# Patient Record
Sex: FEMALE | Race: BLACK OR AFRICAN AMERICAN | NOT HISPANIC OR LATINO | Employment: FULL TIME | URBAN - METROPOLITAN AREA
[De-identification: names, ages, dates, MRNs, and addresses within clinical notes are randomized per-mention and may not be internally consistent; named-entity substitution may affect disease eponyms.]

---

## 2018-11-26 ENCOUNTER — APPOINTMENT (OUTPATIENT)
Dept: CT IMAGING | Facility: HOSPITAL | Age: 48
End: 2018-11-26

## 2018-11-26 ENCOUNTER — HOSPITAL ENCOUNTER (EMERGENCY)
Facility: HOSPITAL | Age: 48
Discharge: HOME | End: 2018-11-26
Attending: EMERGENCY MEDICINE | Admitting: EMERGENCY MEDICINE

## 2018-11-26 VITALS
BODY MASS INDEX: 39.5 KG/M2 | TEMPERATURE: 97.1 F | RESPIRATION RATE: 16 BRPM | WEIGHT: 266.7 LBS | HEIGHT: 69 IN | OXYGEN SATURATION: 97 % | DIASTOLIC BLOOD PRESSURE: 100 MMHG | HEART RATE: 67 BPM | SYSTOLIC BLOOD PRESSURE: 148 MMHG

## 2018-11-26 DIAGNOSIS — R42 VERTIGO: Primary | ICD-10-CM

## 2018-11-26 DIAGNOSIS — R51.9 SINUS HEADACHE: ICD-10-CM

## 2018-11-26 LAB
ALBUMIN SERPL-MCNC: 3.9 G/DL (ref 3.5–5.2)
ALBUMIN/GLOB SERPL: 1.1 G/DL
ALP SERPL-CCNC: 110 U/L (ref 39–117)
ALT SERPL W P-5'-P-CCNC: 12 U/L (ref 1–33)
ANION GAP SERPL CALCULATED.3IONS-SCNC: 10.7 MMOL/L
AST SERPL-CCNC: 15 U/L (ref 1–32)
BASOPHILS # BLD AUTO: 0.01 10*3/MM3 (ref 0–0.2)
BASOPHILS NFR BLD AUTO: 0.1 % (ref 0–1.5)
BILIRUB SERPL-MCNC: 0.3 MG/DL (ref 0.1–1.2)
BUN BLD-MCNC: 8 MG/DL (ref 6–20)
BUN/CREAT SERPL: 10 (ref 7–25)
CALCIUM SPEC-SCNC: 9.7 MG/DL (ref 8.6–10.5)
CHLORIDE SERPL-SCNC: 107 MMOL/L (ref 98–107)
CO2 SERPL-SCNC: 27.3 MMOL/L (ref 22–29)
CREAT BLD-MCNC: 0.8 MG/DL (ref 0.57–1)
DEPRECATED RDW RBC AUTO: 44.9 FL (ref 37–54)
EOSINOPHIL # BLD AUTO: 0.08 10*3/MM3 (ref 0–0.7)
EOSINOPHIL NFR BLD AUTO: 1 % (ref 0.3–6.2)
ERYTHROCYTE [DISTWIDTH] IN BLOOD BY AUTOMATED COUNT: 13 % (ref 11.7–13)
GFR SERPL CREATININE-BSD FRML MDRD: 92 ML/MIN/1.73
GLOBULIN UR ELPH-MCNC: 3.7 GM/DL
GLUCOSE BLD-MCNC: 82 MG/DL (ref 65–99)
HCT VFR BLD AUTO: 40.1 % (ref 35.6–45.5)
HGB BLD-MCNC: 13.7 G/DL (ref 11.9–15.5)
HOLD SPECIMEN: NORMAL
HOLD SPECIMEN: NORMAL
IMM GRANULOCYTES # BLD: 0.01 10*3/MM3 (ref 0–0.03)
IMM GRANULOCYTES NFR BLD: 0.1 % (ref 0–0.5)
LYMPHOCYTES # BLD AUTO: 2.81 10*3/MM3 (ref 0.9–4.8)
LYMPHOCYTES NFR BLD AUTO: 34.3 % (ref 19.6–45.3)
MCH RBC QN AUTO: 32.8 PG (ref 26.9–32)
MCHC RBC AUTO-ENTMCNC: 34.2 G/DL (ref 32.4–36.3)
MCV RBC AUTO: 95.9 FL (ref 80.5–98.2)
MONOCYTES # BLD AUTO: 0.58 10*3/MM3 (ref 0.2–1.2)
MONOCYTES NFR BLD AUTO: 7.1 % (ref 5–12)
NEUTROPHILS # BLD AUTO: 4.72 10*3/MM3 (ref 1.9–8.1)
NEUTROPHILS NFR BLD AUTO: 57.5 % (ref 42.7–76)
PLATELET # BLD AUTO: 223 10*3/MM3 (ref 140–500)
PMV BLD AUTO: 10.2 FL (ref 6–12)
POTASSIUM BLD-SCNC: 3.9 MMOL/L (ref 3.5–5.2)
PROT SERPL-MCNC: 7.6 G/DL (ref 6–8.5)
RBC # BLD AUTO: 4.18 10*6/MM3 (ref 3.9–5.2)
SODIUM BLD-SCNC: 145 MMOL/L (ref 136–145)
WBC NRBC COR # BLD: 8.2 10*3/MM3 (ref 4.5–10.7)
WHOLE BLOOD HOLD SPECIMEN: NORMAL
WHOLE BLOOD HOLD SPECIMEN: NORMAL

## 2018-11-26 PROCEDURE — 96374 THER/PROPH/DIAG INJ IV PUSH: CPT

## 2018-11-26 PROCEDURE — 96375 TX/PRO/DX INJ NEW DRUG ADDON: CPT

## 2018-11-26 PROCEDURE — 99284 EMERGENCY DEPT VISIT MOD MDM: CPT

## 2018-11-26 PROCEDURE — 70450 CT HEAD/BRAIN W/O DYE: CPT

## 2018-11-26 PROCEDURE — 80053 COMPREHEN METABOLIC PANEL: CPT | Performed by: PHYSICIAN ASSISTANT

## 2018-11-26 PROCEDURE — 85025 COMPLETE CBC W/AUTO DIFF WBC: CPT | Performed by: PHYSICIAN ASSISTANT

## 2018-11-26 PROCEDURE — 25010000002 KETOROLAC TROMETHAMINE PER 15 MG: Performed by: EMERGENCY MEDICINE

## 2018-11-26 PROCEDURE — 25010000002 ONDANSETRON PER 1 MG: Performed by: EMERGENCY MEDICINE

## 2018-11-26 RX ORDER — MECLIZINE HYDROCHLORIDE 25 MG/1
25 TABLET ORAL ONCE
Status: COMPLETED | OUTPATIENT
Start: 2018-11-26 | End: 2018-11-26

## 2018-11-26 RX ORDER — ONDANSETRON 2 MG/ML
4 INJECTION INTRAMUSCULAR; INTRAVENOUS ONCE
Status: COMPLETED | OUTPATIENT
Start: 2018-11-26 | End: 2018-11-26

## 2018-11-26 RX ORDER — ONDANSETRON 4 MG/1
4 TABLET, FILM COATED ORAL EVERY 6 HOURS PRN
Qty: 10 TABLET | Refills: 0 | Status: SHIPPED | OUTPATIENT
Start: 2018-11-26

## 2018-11-26 RX ORDER — KETOROLAC TROMETHAMINE 15 MG/ML
15 INJECTION, SOLUTION INTRAMUSCULAR; INTRAVENOUS ONCE
Status: COMPLETED | OUTPATIENT
Start: 2018-11-26 | End: 2018-11-26

## 2018-11-26 RX ORDER — MECLIZINE HYDROCHLORIDE 25 MG/1
25 TABLET ORAL EVERY 6 HOURS PRN
Qty: 10 TABLET | Refills: 0 | Status: SHIPPED | OUTPATIENT
Start: 2018-11-26

## 2018-11-26 RX ADMIN — ONDANSETRON 4 MG: 2 INJECTION INTRAMUSCULAR; INTRAVENOUS at 19:11

## 2018-11-26 RX ADMIN — KETOROLAC TROMETHAMINE 15 MG: 15 INJECTION, SOLUTION INTRAMUSCULAR; INTRAVENOUS at 19:12

## 2018-11-26 RX ADMIN — MECLIZINE HYDROCHLORIDE 25 MG: 25 TABLET ORAL at 19:13

## 2018-11-26 NOTE — ED PROVIDER NOTES
EMERGENCY DEPARTMENT ENCOUNTER    CHIEF COMPLAINT  Chief Complaint: HA  History given by: patient, family  History limited by: nothing  Room Number: 29/29  PMD: Provider, No Known      HPI:  Pt is a 48 y.o. female who presents complaining of a frontal HA that began two days ago when she woke up. Pt states that she also had dizziness (described as the room spinning). Pt states that her HA and dizziness improved after several hours. Pt states that her HA and dizziness woke her up from sleep around 0200 this morning. Pt states that she then developed nausea and dry heaving. Pt states that she took Aleve with resolution of her HA until around 1100 this morning. Pt states that her HA is worse after leaning backwards. Pt denies nasal drainage, sinus congestion or fever but complains of chills. Pt states that she went to  at that time and was then sent to the ED for further evaluation her symptoms.     Duration:  3 days  Onset: gradual  Timing: intermittent  Location: frontal head  Radiation: none  Quality: unspecified  Intensity/Severity: moderate to severe  Progression: worsening  Associated Symptoms: nausea, dry heaving, dizziness, chills  Aggravating Factors: leaning backwards  Alleviating Factors: none  Previous Episodes: none mentioned  Treatment before arrival: Pt states that she went to  at that time and was then sent to the ED for further evaluation her symptoms.       PAST MEDICAL HISTORY  Active Ambulatory Problems     Diagnosis Date Noted   • No Active Ambulatory Problems     Resolved Ambulatory Problems     Diagnosis Date Noted   • No Resolved Ambulatory Problems     No Additional Past Medical History       PAST SURGICAL HISTORY  Past Surgical History:   Procedure Laterality Date   • HYSTERECTOMY         FAMILY HISTORY  Family History   Problem Relation Age of Onset   • No Known Problems Mother    • No Known Problems Father        SOCIAL HISTORY  Social History     Socioeconomic History   • Marital  status:      Spouse name: Not on file   • Number of children: Not on file   • Years of education: Not on file   • Highest education level: Not on file   Social Needs   • Financial resource strain: Not on file   • Food insecurity - worry: Not on file   • Food insecurity - inability: Not on file   • Transportation needs - medical: Not on file   • Transportation needs - non-medical: Not on file   Occupational History   • Not on file   Tobacco Use   • Smoking status: Former Smoker   Substance and Sexual Activity   • Alcohol use: No   • Drug use: Yes     Types: Marijuana   • Sexual activity: Defer   Other Topics Concern   • Not on file   Social History Narrative   • Not on file       ALLERGIES  Patient has no known allergies.    REVIEW OF SYSTEMS  Review of Systems   Constitutional: Positive for chills. Negative for fever.   HENT: Negative for sore throat.    Eyes: Negative.    Respiratory: Negative for cough and shortness of breath.    Cardiovascular: Negative for chest pain.   Gastrointestinal: Positive for nausea and vomiting (dry heaving). Negative for abdominal pain and diarrhea.   Genitourinary: Negative for dysuria.   Musculoskeletal: Negative for neck pain.   Skin: Negative for rash.   Allergic/Immunologic: Negative.    Neurological: Positive for dizziness and headaches. Negative for weakness and numbness.   Hematological: Negative.    Psychiatric/Behavioral: Negative.    All other systems reviewed and are negative.      PHYSICAL EXAM  ED Triage Vitals   Temp Heart Rate Resp BP SpO2   11/26/18 1649 11/26/18 1649 11/26/18 1649 11/26/18 1720 11/26/18 1649   97.1 °F (36.2 °C) 81 15 (!) 136/104 97 %      Temp src Heart Rate Source Patient Position BP Location FiO2 (%)   11/26/18 1649 11/26/18 1649 -- 11/26/18 1720 --   Tympanic Monitor  Right arm        Physical Exam   Constitutional: She is oriented to person, place, and time. No distress.   HENT:   Head: Normocephalic and atraumatic.   Right Ear: Tympanic  membrane normal.   Left Ear: Tympanic membrane normal.   Nose: Sinus tenderness (mild, frontal) present.   Mouth/Throat: Mucous membranes are normal.   Eyes: Pupils are equal, round, and reactive to light. Right eye exhibits nystagmus. Left eye exhibits nystagmus.   Nystagmus to the right that mildly reproduces her dizziness   Neck: Normal range of motion. Neck supple.   Cardiovascular: Normal rate, regular rhythm and normal heart sounds.   Pulmonary/Chest: Effort normal and breath sounds normal. No respiratory distress.   Abdominal: Soft. Bowel sounds are normal. She exhibits no distension. There is no tenderness. There is no rebound and no guarding.   Musculoskeletal: Normal range of motion. She exhibits no edema.   Neurological: She is alert and oriented to person, place, and time. She has normal sensation and normal strength.   Skin: Skin is warm and dry. No rash noted.   Psychiatric: Mood and affect normal.   Nursing note and vitals reviewed.      LAB RESULTS  Lab Results (last 24 hours)     Procedure Component Value Units Date/Time    CBC & Differential [[id number]] Collected:  11/26/18 1723    Specimen:  Blood Updated:  11/26/18 1739    Narrative:       The following orders were created for panel order CBC & Differential.  Procedure                               Abnormality         Status                     ---------                               -----------         ------                     CBC Auto Differential[[id number]]         Abnormal            Final result                 Please view results for these tests on the individual orders.    Comprehensive Metabolic Panel [[id number]] Collected:  11/26/18 1723    Specimen:  Blood Updated:  11/26/18 1755     Glucose 82 mg/dL      BUN 8 mg/dL      Creatinine 0.80 mg/dL      Sodium 145 mmol/L      Potassium 3.9 mmol/L      Chloride 107 mmol/L      CO2 27.3 mmol/L      Calcium 9.7 mg/dL      Total Protein 7.6 g/dL      Albumin 3.90 g/dL      ALT (SGPT) 12 U/L       AST (SGOT) 15 U/L      Alkaline Phosphatase 110 U/L      Total Bilirubin 0.3 mg/dL      eGFR  African Amer 92 mL/min/1.73      Globulin 3.7 gm/dL      A/G Ratio 1.1 g/dL      BUN/Creatinine Ratio 10.0     Anion Gap 10.7 mmol/L     CBC Auto Differential [[id number]]  (Abnormal) Collected:  11/26/18 1723    Specimen:  Blood Updated:  11/26/18 1739     WBC 8.20 10*3/mm3      RBC 4.18 10*6/mm3      Hemoglobin 13.7 g/dL      Hematocrit 40.1 %      MCV 95.9 fL      MCH 32.8 pg      MCHC 34.2 g/dL      RDW 13.0 %      RDW-SD 44.9 fl      MPV 10.2 fL      Platelets 223 10*3/mm3      Neutrophil % 57.5 %      Lymphocyte % 34.3 %      Monocyte % 7.1 %      Eosinophil % 1.0 %      Basophil % 0.1 %      Immature Grans % 0.1 %      Neutrophils, Absolute 4.72 10*3/mm3      Lymphocytes, Absolute 2.81 10*3/mm3      Monocytes, Absolute 0.58 10*3/mm3      Eosinophils, Absolute 0.08 10*3/mm3      Basophils, Absolute 0.01 10*3/mm3      Immature Grans, Absolute 0.01 10*3/mm3           I ordered the above labs and reviewed the results    RADIOLOGY  CT Head Without Contrast   Preliminary Result   There is minimal small vessel disease in the frontal   periventricular white matter. Otherwise, the head CT is within normal   limits. The etiology of the headache, nausea and dizziness is not   established on this exam. The results were communicated to Zane Starks,   the physician assistant in the ER taking care of the patient, by   telephone on 11/26/2018 at 5:45 PM.       Radiation dose reduction techniques were utilized, including automated   exposure control and exposure modulation based on body size.                   I ordered the above noted radiological studies. Interpreted by radiologist. Reviewed by me in PACS.       PROCEDURES  Procedures      PROGRESS AND CONSULTS  ED Course as of Nov 26 1947 Mon Nov 26, 2018   1708 3 day h/o HA, dizziness  [EE]   1749 Neg CT per [facility]  [EE]      ED Course User Index  [EE] Zane Starks, PA     1841-  Notified pt and family of the pt's lab and imaging results, including the pt's negative CT Head and WBC count. D/w pt and family that I believe that the pt's symptoms are c/w vertigo secondary to a sinus HA and pressure. Discussed the plan to order medication in the ED prior to discharging the pt home with prescriptions for meclizine and zofran. Pt understands and agrees with the plan, all questions answered.    1859- Ordered meclizine for dizziness, zofran for nausea and toradol for HA.    1943- Rechecked pt. Pt is resting comfortably and states that she feels much better after medication in the ED. Discussed the plan to discharge the pt home with prescriptions for meclizine and zofran. I instructed the pt to take Advil Cold and Flu for her Sinus HA. Pt understands and agrees with the plan, all questions answered.      MEDICAL DECISION MAKING  Results were reviewed/discussed with the patient and they were also made aware of online access. Pt also made aware that some labs, such as cultures, will not be resulted during ER visit and follow up with PMD is necessary.     MDM  Number of Diagnoses or Management Options  Sinus headache:   Vertigo:      Amount and/or Complexity of Data Reviewed  Clinical lab tests: reviewed and ordered (WBC=8.20)  Tests in the radiology section of CPT®: ordered and reviewed (CT head shows nothing acute)  Decide to obtain previous medical records or to obtain history from someone other than the patient: yes  Review and summarize past medical records: yes (Pt was seen at [facility] in November 2017 for a HA. Pt had negative CTA Head and Neck at that time.)  Independent visualization of images, tracings, or specimens: yes    Patient Progress  Patient progress: resolved         DIAGNOSIS  Final diagnoses:   Vertigo   Sinus headache       DISPOSITION  DISCHARGE    Patient discharged in stable condition.    Reviewed implications of results, diagnosis, meds, responsibility to follow up, warning signs  and symptoms of possible worsening, potential complications and reasons to return to ER.    Patient/Family voiced understanding of above instructions.    Discussed plan for discharge, as there is no emergent indication for admission. Patient referred to primary care provider for BP management due to today's BP. Pt/family is agreeable and understands need for follow up and repeat testing.  Pt is aware that discharge does not mean that nothing is wrong but it indicates no emergency is present that requires admission and they must continue care with follow-up as given below or physician of their choice.     FOLLOW-UP  PATIENT LIAISON [facility] [id number]  [phone]  Schedule an appointment as soon as possible for a visit            Medication List      New Prescriptions    meclizine 25 MG tablet  Commonly known as:  ANTIVERT  Take 1 tablet by mouth Every 6 (Six) Hours As Needed for dizziness.     ondansetron 4 MG tablet  Commonly known as:  ZOFRAN  Take 1 tablet by mouth Every 6 (Six) Hours As Needed for Nausea or   Vomiting.        Stop    cefdinir 300 MG capsule  Commonly known as:  OMNICEF              Latest Documented Vital Signs:  As of 7:47 PM  BP- 139/90 HR- 66 Temp- 97.1 °F (36.2 °C) (Tympanic) O2 sat- 97%    --  Documentation assistance provided by keith Corcoran for Dr. Tate.  Information recorded by the scribe was done at my direction and has been verified and validated by me.     Tania Corcoran  11/26/18 1947       Jim Tate MD  11/26/18 2050

## 2018-11-27 NOTE — ED NOTES
Patient states she feels better. Dizziness and headache has subsided.after medication.     Christina Bueno RN  11/26/18 2002